# Patient Record
Sex: FEMALE | Race: ASIAN | Employment: UNEMPLOYED | URBAN - METROPOLITAN AREA
[De-identification: names, ages, dates, MRNs, and addresses within clinical notes are randomized per-mention and may not be internally consistent; named-entity substitution may affect disease eponyms.]

---

## 2017-09-07 ENCOUNTER — TRANSFERRED RECORDS (OUTPATIENT)
Dept: HEALTH INFORMATION MANAGEMENT | Facility: CLINIC | Age: 32
End: 2017-09-07

## 2017-10-16 ENCOUNTER — TRANSFERRED RECORDS (OUTPATIENT)
Dept: HEALTH INFORMATION MANAGEMENT | Facility: CLINIC | Age: 32
End: 2017-10-16

## 2017-10-18 ENCOUNTER — TRANSFERRED RECORDS (OUTPATIENT)
Dept: HEALTH INFORMATION MANAGEMENT | Facility: CLINIC | Age: 32
End: 2017-10-18

## 2017-11-08 ENCOUNTER — TRANSFERRED RECORDS (OUTPATIENT)
Dept: HEALTH INFORMATION MANAGEMENT | Facility: CLINIC | Age: 32
End: 2017-11-08

## 2018-08-15 ENCOUNTER — TRANSFERRED RECORDS (OUTPATIENT)
Dept: HEALTH INFORMATION MANAGEMENT | Facility: CLINIC | Age: 33
End: 2018-08-15

## 2018-08-28 ENCOUNTER — TRANSFERRED RECORDS (OUTPATIENT)
Dept: HEALTH INFORMATION MANAGEMENT | Facility: CLINIC | Age: 33
End: 2018-08-28

## 2019-02-26 ENCOUNTER — TRANSFERRED RECORDS (OUTPATIENT)
Dept: HEALTH INFORMATION MANAGEMENT | Facility: CLINIC | Age: 34
End: 2019-02-26

## 2019-02-26 LAB — TSH SERPL-ACNC: 0.72 UIU/ML (ref 0.45–4.5)

## 2019-09-24 LAB
ALT SERPL-CCNC: 46 U/L (ref 0–40)
AST SERPL-CCNC: 31 U/L (ref 9–40)
CREATININE (EXTERNAL): 0.61 MG/DL (ref 0.6–1.1)
GFR ESTIMATED (EXTERNAL): 120 ML/MIN/1.73M2
GLUCOSE (EXTERNAL): 3.7 MG/DL (ref 3.5–5)
POTASSIUM (EXTERNAL): 3.7 MMOL/L (ref 3.5–5)
TSH SERPL-ACNC: 0.04 UIU/ML (ref 0.46–4.88)

## 2019-12-10 LAB — TSH SERPL-ACNC: 0.7 UIU/ML (ref 0.46–4.88)

## 2020-02-21 ENCOUNTER — TRANSFERRED RECORDS (OUTPATIENT)
Dept: HEALTH INFORMATION MANAGEMENT | Facility: CLINIC | Age: 35
End: 2020-02-21

## 2020-02-21 LAB — TSH SERPL-ACNC: 9.03 UIU/ML (ref 0.46–4.88)

## 2020-07-22 LAB — TSH SERPL-ACNC: 14.84 MU/L (ref 0.35–4.94)

## 2021-05-12 LAB — TSH SERPL-ACNC: 13.8 MU/L (ref 0.3–4)

## 2021-07-12 LAB — TSH SERPL-ACNC: 7.61 MU/L (ref 0.3–4)

## 2021-10-21 ENCOUNTER — TRANSFERRED RECORDS (OUTPATIENT)
Dept: HEALTH INFORMATION MANAGEMENT | Facility: CLINIC | Age: 36
End: 2021-10-21

## 2021-10-21 LAB — TSH SERPL-ACNC: 0.39 MU/L (ref 0.35–4.94)

## 2021-11-08 ENCOUNTER — TRANSFERRED RECORDS (OUTPATIENT)
Dept: HEALTH INFORMATION MANAGEMENT | Facility: CLINIC | Age: 36
End: 2021-11-08

## 2022-01-14 ENCOUNTER — TRANSFERRED RECORDS (OUTPATIENT)
Dept: HEALTH INFORMATION MANAGEMENT | Facility: CLINIC | Age: 37
End: 2022-01-14

## 2022-01-21 ENCOUNTER — TRANSFERRED RECORDS (OUTPATIENT)
Dept: HEALTH INFORMATION MANAGEMENT | Facility: CLINIC | Age: 37
End: 2022-01-21

## 2022-04-22 ENCOUNTER — TRANSFERRED RECORDS (OUTPATIENT)
Dept: HEALTH INFORMATION MANAGEMENT | Facility: CLINIC | Age: 37
End: 2022-04-22

## 2022-05-12 ENCOUNTER — TRANSFERRED RECORDS (OUTPATIENT)
Dept: HEALTH INFORMATION MANAGEMENT | Facility: CLINIC | Age: 37
End: 2022-05-12

## 2022-05-24 ENCOUNTER — TRANSFERRED RECORDS (OUTPATIENT)
Dept: HEALTH INFORMATION MANAGEMENT | Facility: CLINIC | Age: 37
End: 2022-05-24

## 2022-11-15 ENCOUNTER — TRANSCRIBE ORDERS (OUTPATIENT)
Dept: OTHER | Age: 37
End: 2022-11-15

## 2022-11-15 ENCOUNTER — TRANSFERRED RECORDS (OUTPATIENT)
Dept: HEALTH INFORMATION MANAGEMENT | Facility: CLINIC | Age: 37
End: 2022-11-15

## 2022-11-15 DIAGNOSIS — C07 ADENOID CYSTIC CARCINOMA OF PAROTID GLAND (H): Primary | ICD-10-CM

## 2022-11-16 ENCOUNTER — TELEPHONE (OUTPATIENT)
Dept: OTOLARYNGOLOGY | Facility: CLINIC | Age: 37
End: 2022-11-16

## 2022-11-16 NOTE — TELEPHONE ENCOUNTER
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 She is wondering if she can be seen sooner, she declined to schedule as she will be gone. She said that she needs scans and labs as well. She would like a call back please.          Action Taken: Other: ENT    Travel Screening: Not Applicable

## 2022-11-17 NOTE — TELEPHONE ENCOUNTER
This patient needs a New appt with either Dr. Hector, Sil, or Bhavna.    Appt Note- Ref by Nakul Cooper for Adenoid Cystic Carcinoma of Parotid Gland

## 2022-11-22 NOTE — TELEPHONE ENCOUNTER
FUTURE VISIT INFORMATION      FUTURE VISIT INFORMATION:    Date: 12/12/2022    Time: 2:40 PM     Location: [facility]   REFERRAL INFORMATION:    Referring provider:  Dr. Nakul Cooper    Referring providers clinic:  [facility]     Reason for visit/diagnosis  Adenoid cystic carcinoma of parotid gland     RECORDS REQUESTED FROM:       Clinic name Comments Records Status Imaging Status   [facility] Oncology     [address] [place] [address]  Fax: [phone] 4/22/2022, 1/21/2022 Office visit with Dr. Cooper    Imaging:  - CT Chest: 1/14/2022  - MRI Face: 1/14/2022   Scanned in [facility]     [place] [facility] 8/28/18, 10/8/17 Office visit with Dr. Dang   Scanned in [facility]   [address] [place] [address]    [phone] 5/24/22 Case: [id number] Parotid Gland, LT PAROTID BED BX  Surgical Pathology: 10/18/17  Parotidectomy: 10/16/17    Imaging:  - US: 9/7/17   Scanned in [facility] Left selection neck dissection:  4/8/2004 Scanned in Epic

## 2022-12-01 ENCOUNTER — LAB (OUTPATIENT)
Dept: LAB | Facility: CLINIC | Age: 37
End: 2022-12-01
Payer: COMMERCIAL

## 2022-12-01 ENCOUNTER — OFFICE VISIT (OUTPATIENT)
Dept: INTERNAL MEDICINE | Facility: CLINIC | Age: 37
End: 2022-12-01
Payer: COMMERCIAL

## 2022-12-01 VITALS
DIASTOLIC BLOOD PRESSURE: 56 MMHG | TEMPERATURE: 98.4 F | OXYGEN SATURATION: 97 % | HEIGHT: 60 IN | SYSTOLIC BLOOD PRESSURE: 95 MMHG | HEART RATE: 75 BPM | BODY MASS INDEX: 23.46 KG/M2 | WEIGHT: 119.5 LBS

## 2022-12-01 DIAGNOSIS — K21.00 GASTROESOPHAGEAL REFLUX DISEASE WITH ESOPHAGITIS, UNSPECIFIED WHETHER HEMORRHAGE: ICD-10-CM

## 2022-12-01 DIAGNOSIS — K58.0 IRRITABLE BOWEL SYNDROME WITH DIARRHEA: Primary | ICD-10-CM

## 2022-12-01 DIAGNOSIS — Z85.850 HISTORY OF THYROID CANCER: ICD-10-CM

## 2022-12-01 DIAGNOSIS — K59.1 FUNCTIONAL DIARRHEA: ICD-10-CM

## 2022-12-01 DIAGNOSIS — E89.0 POSTABLATIVE HYPOTHYROIDISM: ICD-10-CM

## 2022-12-01 LAB
ALBUMIN SERPL BCG-MCNC: 4.7 G/DL (ref 3.5–5.2)
ALP SERPL-CCNC: 64 U/L (ref 35–104)
ALT SERPL W P-5'-P-CCNC: 58 U/L (ref 10–35)
ANION GAP SERPL CALCULATED.3IONS-SCNC: 13 MMOL/L (ref 7–15)
AST SERPL W P-5'-P-CCNC: 40 U/L (ref 10–35)
BILIRUB DIRECT SERPL-MCNC: <0.2 MG/DL (ref 0–0.3)
BILIRUB SERPL-MCNC: 0.3 MG/DL
BUN SERPL-MCNC: 11.2 MG/DL (ref 6–20)
CALCIUM SERPL-MCNC: 8.1 MG/DL (ref 8.6–10)
CHLORIDE SERPL-SCNC: 105 MMOL/L (ref 98–107)
CREAT SERPL-MCNC: 0.6 MG/DL (ref 0.51–0.95)
DEPRECATED HCO3 PLAS-SCNC: 22 MMOL/L (ref 22–29)
ERYTHROCYTE [DISTWIDTH] IN BLOOD BY AUTOMATED COUNT: 12.2 % (ref 10–15)
GFR SERPL CREATININE-BSD FRML MDRD: >90 ML/MIN/1.73M2
GLUCOSE SERPL-MCNC: 103 MG/DL (ref 70–99)
HCT VFR BLD AUTO: 39.4 % (ref 35–47)
HGB BLD-MCNC: 13.5 G/DL (ref 11.7–15.7)
MCH RBC QN AUTO: 29.4 PG (ref 26.5–33)
MCHC RBC AUTO-ENTMCNC: 34.3 G/DL (ref 31.5–36.5)
MCV RBC AUTO: 86 FL (ref 78–100)
PLATELET # BLD AUTO: 287 10E3/UL (ref 150–450)
POTASSIUM SERPL-SCNC: 3.9 MMOL/L (ref 3.4–5.3)
PROT SERPL-MCNC: 7.3 G/DL (ref 6.4–8.3)
RBC # BLD AUTO: 4.59 10E6/UL (ref 3.8–5.2)
SODIUM SERPL-SCNC: 140 MMOL/L (ref 136–145)
T4 FREE SERPL-MCNC: 1.7 NG/DL (ref 0.9–1.7)
TSH SERPL DL<=0.005 MIU/L-ACNC: 0.02 UIU/ML (ref 0.3–4.2)
WBC # BLD AUTO: 5.8 10E3/UL (ref 4–11)

## 2022-12-01 PROCEDURE — 99204 OFFICE O/P NEW MOD 45 MIN: CPT | Mod: GC | Performed by: STUDENT IN AN ORGANIZED HEALTH CARE EDUCATION/TRAINING PROGRAM

## 2022-12-01 PROCEDURE — 36415 COLL VENOUS BLD VENIPUNCTURE: CPT | Performed by: PATHOLOGY

## 2022-12-01 PROCEDURE — 85027 COMPLETE CBC AUTOMATED: CPT | Performed by: PATHOLOGY

## 2022-12-01 PROCEDURE — 80053 COMPREHEN METABOLIC PANEL: CPT | Performed by: PATHOLOGY

## 2022-12-01 PROCEDURE — 82248 BILIRUBIN DIRECT: CPT | Performed by: PATHOLOGY

## 2022-12-01 PROCEDURE — 84439 ASSAY OF FREE THYROXINE: CPT | Performed by: PATHOLOGY

## 2022-12-01 PROCEDURE — 84443 ASSAY THYROID STIM HORMONE: CPT | Performed by: PATHOLOGY

## 2022-12-01 RX ORDER — LEVOTHYROXINE SODIUM 112 UG/1
112 TABLET ORAL DAILY
Qty: 30 TABLET | Refills: 3 | Status: SHIPPED | OUTPATIENT
Start: 2022-12-01 | End: 2023-04-14

## 2022-12-01 RX ORDER — ONDANSETRON 8 MG/1
TABLET, ORALLY DISINTEGRATING ORAL
COMMUNITY
Start: 2022-11-27 | End: 2022-12-01

## 2022-12-01 RX ORDER — CIPROFLOXACIN 500 MG/1
TABLET, FILM COATED ORAL
COMMUNITY
Start: 2022-11-27 | End: 2022-12-01

## 2022-12-01 RX ORDER — ONDANSETRON HYDROCHLORIDE 4 MG/5ML
SOLUTION ORAL
COMMUNITY
Start: 2015-03-30 | End: 2022-12-01

## 2022-12-01 RX ORDER — ACETAMINOPHEN AND CODEINE PHOSPHATE 120; 12 MG/5ML; MG/5ML
0.35 SOLUTION ORAL
COMMUNITY
Start: 2021-03-23 | End: 2022-12-01

## 2022-12-01 RX ORDER — KETOCONAZOLE 20 MG/G
CREAM TOPICAL
COMMUNITY
Start: 2012-04-19 | End: 2022-12-01

## 2022-12-01 RX ORDER — SODIUM FLUORIDE 1.1 G/100G
GEL ORAL
COMMUNITY
Start: 2022-11-27 | End: 2022-12-01

## 2022-12-01 RX ORDER — LEVOTHYROXINE SODIUM 88 UG/1
88 TABLET ORAL
COMMUNITY
Start: 2021-03-23 | End: 2022-12-01

## 2022-12-01 RX ORDER — DICYCLOMINE HCL 20 MG
TABLET ORAL
COMMUNITY
Start: 2022-11-27 | End: 2022-12-01

## 2022-12-01 RX ORDER — AMMONIUM LACTATE 12 G/100G
LOTION TOPICAL
COMMUNITY
Start: 2012-04-19 | End: 2022-12-01

## 2022-12-01 RX ORDER — DICYCLOMINE HYDROCHLORIDE 10 MG/1
10 CAPSULE ORAL
Qty: 30 CAPSULE | Refills: 3 | Status: SHIPPED | OUTPATIENT
Start: 2022-12-01 | End: 2023-05-17

## 2022-12-01 RX ORDER — LEVOTHYROXINE SODIUM 112 UG/1
112 TABLET ORAL DAILY
COMMUNITY
Start: 2022-07-21 | End: 2022-12-01

## 2022-12-01 RX ORDER — PNV NO.52/IRON/FA/OMEGA-3/DHA 29-1-200MG
COMBINATION PACKAGE (EA) ORAL
COMMUNITY
Start: 2021-11-08 | End: 2022-12-01

## 2022-12-01 RX ORDER — BREAST PUMP
EACH MISCELLANEOUS
COMMUNITY
Start: 2021-03-23 | End: 2022-12-01

## 2022-12-01 RX ORDER — ONDANSETRON 4 MG/1
TABLET, ORALLY DISINTEGRATING ORAL
COMMUNITY
Start: 2015-03-30 | End: 2022-12-01

## 2022-12-01 RX ORDER — SODIUM FLUORIDE 5 MG/G
GEL, DENTIFRICE DENTAL
COMMUNITY
Start: 2022-07-31

## 2022-12-01 NOTE — PROGRESS NOTES
I, Robinson Castellano MD saw the patient with the resident, and agree with the resident's findings and plan of care as documented in the resident's note.  BP 95/56 (BP Location: Right arm, Patient Position: Sitting, Cuff Size: Adult Regular)   Pulse 75   Temp 98.4  F (36.9  C) (Oral)   Ht 1.524 m (5')   Wt 54.2 kg (119 lb 8 oz)   SpO2 97%   BMI 23.34 kg/m    I personally reviewed vital signs and past record.  Key findings: multiple chronic medical problems   Needs TSH - has been uncertain length of time.  Irregular bowel symptoms, mostly consistent with IBS.   Preventive services reviewed.    ADDENDUM: suppressed TSH likely permissive since she has a history of papillary CA.

## 2022-12-01 NOTE — PROGRESS NOTES
CC: est care      HPI:  36-year-old female with past medical history of thyroid cancer status post total thyroidectomy and radiation.  She comes in to [facility] care, she is recently moved here.  She needs a refill of her thyroid medication and repeat thyroid hormone check.    She also would like to discuss off-and-on abdominal pain that she has been having.  It is in the epigastric region happens rarely, a few times a year.  She will have cramping-like sensation, nonradiating, associated with diarrhea.  There is no blood in her stool.  She feels nauseous during these episodes however has not vomited.  No fevers, no unexplained weight loss, no constipation.  She took some Bentyl prescribed another provider and it helped with the symptoms.  She has never had a colonoscopy.  She has also been having off-and-on reflux, not necessarily associated with this pain.    PMH: I have personally reviewed the patient's medical history, medications, and allergies.      BP 95/56 (BP Location: Right arm, Patient Position: Sitting, Cuff Size: Adult Regular)   Pulse 75   Temp 98.4  F (36.9  C) (Oral)   Ht 1.524 m (5')   Wt 54.2 kg (119 lb 8 oz)   SpO2 97%   BMI 23.34 kg/m    Physical Examination:    General:  Alert, NAD  HEENT: NC/AT.   Lungs:  CTAB.   C/V:  RRR with no m/r/g.    Abdomen:  soft, ND, NT  Neuro: Alert and conversant, face symmetric. No gross neurologic deficits.  Skin:   warm, dry, no rashes on exposed skin surfaces  Psych:  Alert and oriented. Appropriate affect.        A&P:  RHM  -vaccinations - needs bivalent, will think about it  -UTD on pap - had 2 years ago    GERD  -refill omeprazole    IBS-D  Intermittent abdominal pain/nausea  -start with bentyl PRN, lab w/up  -RTC 4 weeks. In future if symptoms worsening consider colonoscopy v h pylori    H/o papillary thyroid cancer s/p thyroidectomy  -endocrine referral  -recheck tsh  -refill levothyroxine    Jasmin Phelps MD  IM Resident PGY-3    This patient was  discussed and seen with Dr. Castellano

## 2022-12-01 NOTE — RESULT ENCOUNTER NOTE
Thyroid tests look ok - we can keep you at the same dose of levothyroxine can you can go ahead and pick it up. Your liver tests are very slightly elevated - could be due to a viral bug. We will check again in several weeks to see if they have normalized. If not we can discuss further workup. Blood counts and electrolytes look normal.

## 2022-12-01 NOTE — NURSING NOTE
Shayy Calles is a 36 year old female patient that presents today in clinic for the following:    Chief Complaint   Patient presents with     RECHECK     Establish care.  Discuss medication.  Check thyroid.     The patient's allergies and medications were reviewed as noted. A set of vitals were recorded as noted without incident: BP 95/56 (BP Location: Right arm, Patient Position: Sitting, Cuff Size: Adult Regular)   Pulse 75   Temp 98.4  F (36.9  C) (Oral)   Ht 1.524 m (5')   Wt 54.2 kg (119 lb 8 oz)   SpO2 97%   BMI 23.34 kg/m  . The patient does not have any other questions for the provider.    Tristian Cortes, EMT at 9:56 AM on 12/1/2022.  Primary care clinic: [phone]

## 2022-12-12 ENCOUNTER — PRE VISIT (OUTPATIENT)
Dept: OTOLARYNGOLOGY | Facility: CLINIC | Age: 37
End: 2022-12-12

## 2023-01-12 ENCOUNTER — VIRTUAL VISIT (OUTPATIENT)
Dept: INTERNAL MEDICINE | Facility: CLINIC | Age: 38
End: 2023-01-12
Payer: COMMERCIAL

## 2023-01-12 DIAGNOSIS — R74.01 TRANSAMINITIS: Primary | ICD-10-CM

## 2023-01-12 PROCEDURE — 99213 OFFICE O/P EST LOW 20 MIN: CPT | Mod: 95 | Performed by: STUDENT IN AN ORGANIZED HEALTH CARE EDUCATION/TRAINING PROGRAM

## 2023-01-12 NOTE — PATIENT INSTRUCTIONS
Thank you for visiting the Primary Care Center today at the [facility]! The following is some information about our clinic:     Primary Care Center Frequently-Asked Questions    (1) How do I schedule appointments at the [facility]?     Primary Care--to schedule or make changes to an existing appointment, please call our primary care line at [phone].    Labs--to schedule a lab appointment at the [facility] you can use [employer] or call [phone]. If you have a [place] location that is closer to home, you can reach out to that location for scheduling options.     Imaging--if you need to schedule a CT, X-ray, MRI, ultrasound, or other imaging study you can call [phone] to schedule at the [facility] or any other [facility] imaging location.     Referrals--if a referral to another specialty was ordered you can expect a phone call from their scheduling team. If you have not heard from them in a week, please call us or send us a [employer] message to check the status or get a scheduling number. Please note that this only applies to internal [facility] referrals. If the referral is external you would need to contact their office for scheduling.     (2) I have a question about my visit, who do I contact?     You can call us at the primary care line at [phone] to ask questions about your visit. You can also send a secure message through [employer], which is reviewed by clinic staff. Please note that [employer] messages have a twenty-four to forty-eight business hour turnaround time and should not be used for urgent concerns.    (3) How will I get the results of my tests?    If you are signed up for [employer] all tests will be released to you within twenty-four hours of resulting. Please allow three to five days for your doctor to review your results and place a note interpreting the results. If you do not have [employer] you will receive your  results through mail seven to ten business days following the return of the tests. Please note that if there should be any urgent or concerning results that your doctor or their registered nurse will reach out to you the same day as the tests come back. If you have follow up questions about your results or would like to discuss the results in detail please schedule a follow up with your provider either in person or virtually.     (4) How do I get refills of my prescriptions?     You should always first contact your pharmacy for refills of your medications. If submitting a refill request on [employer], please be sure to submit the request only once--repeat requests can cause delays in refill. If you are requesting a NEW medication or a medication related to new symptoms you will need to schedule an appointment with a provider prior to approval. Please note: Routine medication refills have up to one to three business day turnaround whereas controlled substances refills have up to five to seven business day turnaround.    (5) I have new symptoms, what do I do?     If you are having an immediate medical emergency, you should dial 911 for assistance.   For anything urgent that needs to be seen within a few hours to one day you should visit a local urgent care for assistance.  For non-urgent symptoms that need to be seen within a few days to a week you can schedule with an available provider in primary care by going to [employer] or calling [phone].   If you are not sure how serious your symptoms are or you would like to receive medical advice you can always call [phone] to speak with a triage nurse.

## 2023-01-12 NOTE — PROGRESS NOTES
Shayy is a 37 year old who is being evaluated via a billable video visit.      How would you like to obtain your AVS? Avahart  If the video visit is dropped, the invitation should be resent by: Text to cell phone: [phone]  Will anyone else be joining your video visit? No      CC: 6 week follow up      HPI:  37F with PMH of thyroid cancer s/p thyroidectomy presents today to follow up IBS and GERD symptoms. IBS symptoms including pain and nausea that she was previously having have all resolved, she never needed to take any more bentyl after I saw her. GERD has completely improved with the omeprazole and is not bothering her anymore. She may be interested in having a second child in the future, wondering when to restart prenatal vitamins. We discussed she should go ahead and start now. No other health concerns to discuss today. Currently staying with family in [place].    PMH: I have personally reviewed the patient's medical history, medications, and allergies.      There were no vitals taken for this visit.  Physical Examination:    General:  Alert, NAD  HEENT: NC/AT. EOM grossly intact.  Lungs:  Appears breathing comfortably on room air  Neuro: Alert and conversant, face symmetric. No gross neurologic deficits.  Skin:   warm, dry, no rashes on exposed skin surfaces  Psych:  Alert and oriented. Appropriate affect.        A&P:  IBS  Never needed bentyl, IBS type symptoms have completed resolved. No plan for further workup.    Mildly elevated transaminases  -repeat when she is back in town. Potentially elevated 2/2 to a viral illness but needs repeating to ensure resolution. If up again will need further lab w/up and likely abdominal US.    H/o papillary thyroid cancer s/p thyroidectomy  -on levothyroxine; last TSH suppressed but normal ft4  -endo referral made but no appointment yet - she will make when able to establish care with them    Jasmin Phelps MD  IM Resident PGY-3    This patient was discussed with  Dr Moody        Video-Visit Details    Type of service:  Video Visit     Originating Location (pt. Location): Other gym  Distant Location (provider location):  On-site  Platform used for Video Visit: Rylee

## 2023-02-12 ENCOUNTER — HEALTH MAINTENANCE LETTER (OUTPATIENT)
Age: 38
End: 2023-02-12

## 2023-04-13 NOTE — TELEPHONE ENCOUNTER
DX, Referring NOTES: History of Thyroid Cancer    For Cancer Patients: Need the original operative and surgical pathology reports and all imaging reports/images related to the disease (includes all thyroid US, nuclear thyroid and total body scans, PET scans, chest CT reports since prior to the diagnosis ).   APPT DATE: 5/17/2023   NOTES (FOR ALL VISITS) STATUS DETAILS   OFFICE NOTES from referring provider [facility]:  1/12/23, 12/1/22 - PCC VV with Dr. Phelps   OFFICE NOTES from other specialist Received / Care Everywhere [facility]:  1/10/23 - RAD ONC OV with Dr. Cooper    [facility]:  8/28/18, 11/8/17 - ONC OV with Dr. Dang    [facility] ([place]):  5/24/22, 5/12/22 - ENT OV with Dr. Madrid   ED NOTES N/A    OPERATIVE REPORT  (thyroid, pituitary, adrenal, parathyroid)  (All op notes related to diagnoses) Received [facility]:  10/16/17 - OP Note for TOTAL PAROTIDECTOMY with Dr. [place]  4/9/04 - OP Note for LEFT SELECTIVE DISSECTION, PARATRACHEAL NODE DISSECTION, TOTAL THYROIDECTOMY with Dr. Madrid   MEDICATION LIST Internal    IMAGING      MRI (BRAIN) Received [facility]:  1/3/23 - MRI Orbits/Face  1/14/22 - MRI Orbits/Face  6/8/20 - MRI Orbits/Face  11/25/19 - MRI Orbits/Face  11/14/18 - MRI Orbits/Face    [facility]:  3/22/18 - MRI Neck  11/2/17 - MRI Neck   CT (HEAD/NECK/CHEST/ABDOMEN) Received [facility]:  1/3/23 - CT Chest  1/14/22 - CT Chest  11/25/19 - CT Chest  2/7/19 - CT Chest   ULTRASOUND (HEAD/NECK)  * Include FNAs Received [facility]:  9/7/17 - US Thyroid FNA  12/20/12  US Thyroid   LABS     DIABETES: HBGA1C, CREATININE, FASTING LIPIDS, MICROALBUMIN URINE, POTASSIUM, TSH, T4    THYROID: TSH, T4, CBC, THYRODLONULIN, TOTAL T3, FREE T4, CALCITONIN, CEA Care Everywhere / [facility]:  12/1/22 - BMP  12/1/22 - CBC  12/1/22 - TSH, T4    [facility]:  12/30/20 - Glucose  8/19/20 - HBGA1C  7/22/20 - Thyroglobulin   PATHOLOGY  REPORTS WITH CASE NUMBER  *Surgical path reports for endocrine organs (ovaries, testes, pancreas, etc) Care Everywhere / Received   [facility]:  22 - Parotid Biopsy (Case: [id number])  10/16/17 - Parotid Biopsy (Case: [id number])  17 - Thyroid FNA (Case: [id number])    [facility]:  04 - Thyroid Biopsy (Case: [id number])     Records Requested  23    Facility  [facility]  Phone: [phone]  Fax: [phone]  [facility]  Fax: [phone]  [facility]  Fax: [phone]   Outcome * 23 8:34 AM Faxed urg req to [facility] and [facility] for imaging discs to be Fed'Exd to us.  Faxed req to [facility] for records to be faxed to the clinic. - [place]    * 23 2:40 PM Imaging disc received from [facility] and in [facility] being uploaded into PACs. - [place]    * 23 3:50 PM Records received from [facility] and sent to [facility] to be scanned into the chart. - [place]    [facility]  [facility]

## 2023-04-14 DIAGNOSIS — K59.1 FUNCTIONAL DIARRHEA: ICD-10-CM

## 2023-04-14 DIAGNOSIS — E89.0 POSTABLATIVE HYPOTHYROIDISM: ICD-10-CM

## 2023-04-14 NOTE — TELEPHONE ENCOUNTER
levothyroxine (SYNTHROID/LEVOTHROID) 112 MCG tablet      Last Written Prescription Date:  12/1/22  Last Fill Quantity: 30,   # refills: 3  Last Office Visit : 1/12/23  Future Office visit:  NONE    Routing refill request to provider for review/approval because:  ABNORMAL TSH

## 2023-04-17 RX ORDER — LEVOTHYROXINE SODIUM 112 UG/1
112 TABLET ORAL DAILY
Qty: 30 TABLET | Refills: 3 | Status: SHIPPED | OUTPATIENT
Start: 2023-04-17 | End: 2023-05-24

## 2023-05-02 NOTE — TELEPHONE ENCOUNTER
Records received    May 2, 2023 1:56 PM  [facility]   Facility  [facility]    Outcome Received imaging disc, uploaded to PACS:   MR NECK 3/22/18, 11/2/17  US FNA 9/7/17  US Thyroid 12/20/12

## 2023-05-17 ENCOUNTER — LAB (OUTPATIENT)
Dept: LAB | Facility: CLINIC | Age: 38
End: 2023-05-17
Payer: COMMERCIAL

## 2023-05-17 ENCOUNTER — OFFICE VISIT (OUTPATIENT)
Dept: ENDOCRINOLOGY | Facility: CLINIC | Age: 38
End: 2023-05-17
Attending: PEDIATRICS
Payer: COMMERCIAL

## 2023-05-17 ENCOUNTER — PRE VISIT (OUTPATIENT)
Dept: ENDOCRINOLOGY | Facility: CLINIC | Age: 38
End: 2023-05-17

## 2023-05-17 VITALS
BODY MASS INDEX: 24.16 KG/M2 | WEIGHT: 123.7 LBS | HEART RATE: 78 BPM | OXYGEN SATURATION: 98 % | SYSTOLIC BLOOD PRESSURE: 124 MMHG | DIASTOLIC BLOOD PRESSURE: 79 MMHG

## 2023-05-17 DIAGNOSIS — C73 PAPILLARY THYROID CARCINOMA (H): ICD-10-CM

## 2023-05-17 DIAGNOSIS — R74.01 TRANSAMINITIS: ICD-10-CM

## 2023-05-17 DIAGNOSIS — E89.0 POSTSURGICAL HYPOTHYROIDISM: ICD-10-CM

## 2023-05-17 DIAGNOSIS — C73 PAPILLARY THYROID CARCINOMA (H): Primary | ICD-10-CM

## 2023-05-17 DIAGNOSIS — Z85.850 HISTORY OF THYROID CANCER: ICD-10-CM

## 2023-05-17 LAB
ALBUMIN SERPL BCG-MCNC: 4.5 G/DL (ref 3.5–5.2)
ALP SERPL-CCNC: 60 U/L (ref 35–104)
ALT SERPL W P-5'-P-CCNC: 44 U/L (ref 10–35)
AST SERPL W P-5'-P-CCNC: 21 U/L (ref 10–35)
BILIRUB DIRECT SERPL-MCNC: <0.2 MG/DL (ref 0–0.3)
BILIRUB SERPL-MCNC: 0.3 MG/DL
PROT SERPL-MCNC: 7 G/DL (ref 6.4–8.3)
T4 FREE SERPL-MCNC: 1.61 NG/DL (ref 0.9–1.7)
TSH SERPL DL<=0.005 MIU/L-ACNC: 0.03 UIU/ML (ref 0.3–4.2)

## 2023-05-17 PROCEDURE — 99000 SPECIMEN HANDLING OFFICE-LAB: CPT | Performed by: PATHOLOGY

## 2023-05-17 PROCEDURE — 84443 ASSAY THYROID STIM HORMONE: CPT | Performed by: PATHOLOGY

## 2023-05-17 PROCEDURE — 84432 ASSAY OF THYROGLOBULIN: CPT | Mod: 90 | Performed by: PATHOLOGY

## 2023-05-17 PROCEDURE — 36415 COLL VENOUS BLD VENIPUNCTURE: CPT | Performed by: PATHOLOGY

## 2023-05-17 PROCEDURE — 80076 HEPATIC FUNCTION PANEL: CPT | Performed by: PATHOLOGY

## 2023-05-17 PROCEDURE — 86800 THYROGLOBULIN ANTIBODY: CPT | Mod: 90 | Performed by: PATHOLOGY

## 2023-05-17 PROCEDURE — 84439 ASSAY OF FREE THYROXINE: CPT | Performed by: PATHOLOGY

## 2023-05-17 PROCEDURE — 99204 OFFICE O/P NEW MOD 45 MIN: CPT

## 2023-05-17 RX ORDER — LETROZOLE 2.5 MG/1
TABLET, FILM COATED ORAL
COMMUNITY
Start: 2023-03-20

## 2023-05-17 RX ORDER — PRENATAL VIT/IRON FUM/FOLIC AC 27MG-0.8MG
1 TABLET ORAL DAILY
COMMUNITY

## 2023-05-17 ASSESSMENT — PAIN SCALES - GENERAL: PAINLEVEL: NO PAIN (0)

## 2023-05-17 NOTE — LETTER
5/17/2023       RE: Shayy Calles  [address]  [place] [address]     Dear Colleague,    Thank you for referring your patient, Shayy Calles, to the [facility]. Please see a copy of my visit note below.    Endocrine Consult note    Attending Assessment/Plan :     Papillary thyroid carcinoma, pediatric onset, left 2.8 cm with ETE into ST and muscle , Multifocal, bilateral, + LN 3/3  Yearly lab: Tg, TSH, free T4   Neck US hasn't been done in many years- ordered.      Post surgical hypothyroidism- on LT4 112 mcg/day; target TSH low normal/around 0.4 would be better than recent levels  Labs and adjust as indicated.     Tremor- as above.     Seeking pregnancy.  Discussed.  Would  Check TFTs immediately on diagnosis of pregnancy and monthly treating to pregnancy specific targets.     Chart review/prep time 1  [id number]   _46_ minutes spent on the date of the encounter doing chart review, history and exam, documentation and further activities as noted above.    Maria Antonia Pacheco MD    Chief complaint:  Shayy is a 37 year old female seen in consultation at the request of Dr Robinson Castellano for thyroid cancer   I have reviewed Care Everywhere including [facility], [facility] lab reports, imaging reports and provider notes as indicated.      HISTORY OF PRESENT ILLNESS    The record shows the following history :   4/8/2004 total thyroidectomy,  - papillary carcinoma left with ETE into ST and muscle, positive margins; Right microscopic Papillary thyroid carcinoma; + LN 3/3 with no JITENDRA     She has been on the current dose LT4 since she was pregnant with the now 2 year old boy.   She is seeking another  pregnancy.     She was seeing reproductive endocrinology at [facility]. They were giving her letrozole. She continues on this.  With this her menses have become monthly.      Endocrine relevant labs are as follows:  9/16/19 Tg < 0.5 ARUP  7/22/20 Tg < 0.5 [facility]UP  9/23/2020 free T4 1.9  12/30/20 TSH 0.27  1/27/21 TSH 0.33  5/12/2021 TSH 13.8  7/12/21  Tg < 0.5 ARUP, TSH 7.61  10/21/21 TSH 0.39 free T4 1.7  11/8/2021 Tg < 0.5 [facility]UP  12/1/22 TSH 0.02, free T4 1.7, Ca 8.1, creatinine 0.6    Relevant imaging is as follows: (as read by me as it pertains to endocrine relevant organs)  12/20/12 thyroid US  1/3/2023 CT chest with contrast  1/3/2023 MRI orbita face    REVIEW OF SYSTEMS  Sleep at night is better since baby turned 2  Energy OK   Weight stable   Cardiac: negative  Respiratory: negative   GI;   Acid reflux - feels it in throat   Monthly periods on letrozole.  Periods were irregular before letrozole.   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      levothyroxine (SYNTHROID/LEVOTHROID) 112 MCG tablet Take 1 tablet (112 mcg) by mouth daily 30 tablet 3    omeprazole (PRILOSEC) 20 MG DR capsule Take 1 capsule (20 mg) by mouth daily 30 capsule 4    Prenatal Vit-Fe Fumarate-FA (PRENATAL MULTIVITAMIN W/IRON) 27-0.8 MG tablet Take 1 tablet by mouth daily      sodium fluoride dental gel (PREVIDENT) 1.1 % GEL topical gel        Letrozole is 5 mg on days 3-7 of menstrual cycle.      Allergies  No Known Allergies    Family  History  Family History   Problem Relation Age of Onset    Asthma Mother     Hypertension Mother     Diabetes Father     Myocardial Infarction Father     Hyperlipidemia Father     Lung Cancer Paternal Grandmother 90    Breast Cancer Maternal Aunt     Thyroid Disease Maternal Aunt     Thyroid Cancer No family hx of      2 rosanne simpson Son has recent diagnosis of ASD     Social History  Social History     Tobacco Use    Smoking status: Never    Smokeless tobacco: Never     Just moved from [place] for a year; moving to [place] in 3 months - [facility]    is a stroke fellow     Physical Exam  GENERAL no mask; holding toddler who is crying loudly about 75% of the appt   /79   Pulse 78   Wt 56.1 kg (123 lb 11.2 oz)   SpO2 98%   BMI 24.16 kg/m    SKIN: normal color, temperature, texture ; hat ; keloid scar low neck / upper chest surgical site   HEENT: PER, no scleral icterus, eyelid retraction, stare, lid lag, proptosis or conjunctival injection.  .    NECK: supple.  No visible or palpable neck masses, cervical adenopathy  LUNGS: clear to auscultation bilaterally.   CARDIAC: RRR, S1, S2 without murmurs, rubs or gallops.    BACK: normal spinal contour.    NEURO: Alert, responds appropriately to questions,  moves all extremities, DTRs 2/4, gait normal, + slight  tremor of the outstretched hand    DATA REVIEW     Latest Ref Rng 12/1/2022  10:44 AM 5/17/2023  2:43 PM   ENDO THYROID LABS-[facility]      TSH 0.30 - 4.20 uIU/mL 0.02 (L)  0.03 (L)    FREE T4 0.90 - 1.70 ng/dL 1.70  1.61       Legend:  (L) Low

## 2023-05-17 NOTE — PATIENT INSTRUCTIONS
Yearly labs now and in one year about 2 weeks prior to next appt    Neck US for baseline    Care for the thyroid should be approximately yearly , or sooner in the event of pregnancy    [email or web address] website - information for patients - find a doctor     Please reach out to the following centers to schedule your appointment:       Imaging (DEXA, CT, MRI, XRAY)    [facility] ([facility], [facility], [place]) [phone]   [facility] ([place], [place]) [phone]   [facility] ([facility]) [phone]   [facility] ([facility]) [phone]       Lab    General [phone]   [facility] [phone]   [place] [phone]   [facility]  [phone]   [facility] [phone]   [place] [phone]   [facility]) [phone]   [facility] Walk-In Only   [place] [phone]   [place] [phone]   [place] [phone]   [place] [phone]       Infusion    [facility] [phone]   [place] [phone]   [place] [phone]   [place] [phone]   [place] [phone]   [place] [phone]   [place] [phone]     For any questions, please reach out to the [facility] Clinic Number for assistance: [phone].

## 2023-05-17 NOTE — PROGRESS NOTES
"Endocrine Consult note    Attending Assessment/Plan :     Papillary thyroid carcinoma, pediatric onset, left 2.8 cm with ETE into ST and muscle , Multifocal, bilateral, + LN 3/3  Yearly lab: Tg, TSH, free T4   Neck US hasn't been done in many years- ordered.      Post surgical hypothyroidism- on LT4 112 mcg/day; target TSH low normal/around 0.4 would be better than recent levels  Labs and adjust as indicated.     Addendum  5/17/23 Tg 0.16, JULIANNE < 0.4, TSH 0.03, free T4 1.61,   5/31/23 neck US:   Left level 2 jawline # 1 1.1 x 0.6 x 1.2 cm -- 6/27/23 FNAB LN \"atypical cells\"  Needle wash Tg < 0.1, Julianne < 0.4,   Left level 2 # 2 0.7 x 0.7x 0.8 cm   Left level 6 # 1 0.3 x 0.2 x 0.4 cm   Left level 6 # 2 0.2 x 0.2 x 0.3 cm    Tremor- as above.     Seeking pregnancy.  Discussed.  Would  Check TFTs immediately on diagnosis of pregnancy and monthly treating to pregnancy specific targets.     Chart review/prep time 1  [id number]   _46_ minutes spent on the date of the encounter doing chart review, history and exam, documentation and further activities as noted above.    Maria Antonia Pacheco MD    Chief complaint:  Shayy is a 37 year old female seen in consultation at the request of Dr Robinson Castellano for thyroid cancer   I have reviewed Care Everywhere including [facility], [facility] lab reports, imaging reports and provider notes as indicated.      HISTORY OF PRESENT ILLNESS    The record shows the following history :   4/8/2004 total thyroidectomy,  - papillary carcinoma left with ETE into ST and muscle, positive margins; Right microscopic Papillary thyroid carcinoma; + LN 3/3 with no JITENDRA     She has been on the current dose LT4 since she was pregnant with the now 2 year old boy.   She is seeking another  pregnancy.     She was seeing reproductive endocrinology at [facility]. They were giving her letrozole. She continues on this.  With this her menses have become monthly.     Endocrine " relevant labs are as follows:  9/16/19 Tg < 0.5 [facility]  7/22/20 Tg < 0.5 [facility]  9/23/2020 free T4 1.9  12/30/20 TSH 0.27  1/27/21 TSH 0.33  5/12/2021 TSH 13.8  7/12/21  Tg < 0.5 [facility], TSH 7.61  10/21/21 TSH 0.39 free T4 1.7  11/8/2021 Tg < 0.5 [facility]  12/1/22 TSH 0.02, free T4 1.7, Ca 8.1, creatinine 0.6    Relevant imaging is as follows: (as read by me as it pertains to endocrine relevant organs)  12/20/12 thyroid US  1/3/2023 CT chest with contrast  1/3/2023 MRI orbita face    REVIEW OF SYSTEMS  Sleep at night is better since baby turned 3  Energy OK   Weight stable   Cardiac: negative  Respiratory: negative   GI;   Acid reflux - feels it in throat   Monthly periods on letrozole.  Periods were irregular before letrozole.   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       levothyroxine (SYNTHROID/LEVOTHROID) 112 MCG tablet Take 1 tablet (112 mcg) by mouth daily 30 tablet 3     omeprazole (PRILOSEC) 20 MG DR capsule Take 1 capsule (20 mg) by mouth daily 30 capsule 4     Prenatal Vit-Fe Fumarate-FA (PRENATAL MULTIVITAMIN W/IRON) 27-0.8 MG tablet Take 1 tablet by mouth daily       sodium fluoride dental gel (PREVIDENT) 1.1 % GEL topical gel        Letrozole is 5 mg on days 3-7 of menstrual cycle.      Allergies  No Known Allergies    Family  History  Family History   Problem Relation Age of Onset     Asthma Mother      Hypertension Mother      Diabetes Father      Myocardial Infarction Father      Hyperlipidemia Father      Lung Cancer Paternal Grandmother 90     Breast Cancer Maternal Aunt      Thyroid Disease Maternal Aunt      Thyroid Cancer No family hx of      2 rosanne simpson Son has recent diagnosis of ASD     Social History  Social History     Tobacco Use     Smoking status: Never     Smokeless tobacco: Never     Just moved from [place] for a year; moving to [place] in 3 months - [facility]    is a stroke fellow     Physical Exam  GENERAL no mask; holding toddler who is crying loudly about 75% of the appt   /79   Pulse 78   Wt 56.1 kg (123 lb 11.2 oz)   SpO2 98%   BMI 24.16 kg/m    SKIN: normal color, temperature, texture ; hat ; keloid scar low neck / upper chest surgical site   HEENT: PER, no scleral icterus, eyelid retraction, stare, lid lag, proptosis or conjunctival injection.  .    NECK: supple.  No visible or palpable neck masses, cervical adenopathy  LUNGS: clear to auscultation bilaterally.   CARDIAC: RRR, S1, S2 without murmurs, rubs or gallops.    BACK: normal spinal contour.    NEURO: Alert, responds appropriately to questions,  moves all extremities, DTRs 2/4, gait normal, + slight  tremor of the outstretched hand    DATA REVIEW     Latest Ref Rng 12/1/2022  10:44 AM 5/17/2023  2:43 PM   ENDO THYROID LABS[facility]      TSH 0.30 - 4.20 uIU/mL 0.02 (L)  0.03 (L)    FREE T4 0.90 - 1.70 ng/dL 1.70  1.61       Legend:  (L) Low    EXAMINATION: US HEAD NECK SOFT TISSUE, 5/31/2023 8:32 AM   COMPARISON: None.  HISTORY: Thyroid cancer  TECHNIQUE: Sonographic imaging performed of the neck to evaluate forlymph nodes using grey scale and limited Doppler technique.  FINDINGS:  Lymph nodes are measured bilaterally with measurements given intransverse, AP, and craniocaudal dimensions as follows:  Right:  Level 2: Benign lymph nodes  measuring up to 1.4 x 0.5 x 1.6 cm (#2)with benign fatty hilum, reniform shaped, and nonthickened cortex  Left:  Level 2: Abnormal lymph nodes measuring up to 1.1 x 0.6 x 1.2 cm (#1)with obliteration of the fatty hilum, lobular shape, and thickenedcortex.  Level 6: Questionable tiny lymph nodes in the resection bed to smallto characterize by ultrasound.                                                         IMPRESSION:  1.  Soft tissue neck ultrasound with lymph node measurements asdescribed above. Abnormal lymph nodes left level 2, measuring up to1.2 cm.  I have personally reviewed the examination and initial interpretationand I agree with the findings.  TERRIE MAYES MD     EXAMINATION: US BIOPSY FINE NEEDLE ASPIRATION LYMPH NODE, 6/27/2023  10:43 AM     COMPARISON: 5/31/2023 ultrasound, 1/3/2023 MRI     HISTORY: Postablative hypothyroidism; History of parotid cancer;  Postsurgical hypothyroidism; Cervical adenopathy     FINDINGS: After describing the risks, benefits, and alternatives to  ultrasound guided left level 2 lymph node fine needle aspiration, the  patient elected to proceed and written/informed consent was obtained.     The patient was then placed supine and preliminary grayscale images  demonstrated a mildly enlarged hypoechoic lymph node without  appreciable fatty hilum at left level 2. The patient was then prepped  and draped in a sterile fashion. Local anesthesia was achieved using 2  cc of 1% lidocaine. Under direct sonographic guidance, 4 passes of the  nodule were performed using 25-gauge needles. Preliminary  interpretation from pathology suggests adequate cellularity for  diagnosis. There were no immediate complications.                                                                      IMPRESSION: Technically successful ultrasound-guided left level 2  lymph node fine-needle aspiration.     LIEN TOTH, DO      Fine Needle Aspirate Lymph Node(s), Cervical, Left: [id number]  Order:  [id number]   Collected 6/27/2023 10:22 AM      Status: Final result      Visible to patient: Yes (not seen)      Dx: Postsurgical hypothyroidism; Postabla...      1 Result Note     1 Patient Communication       Component Ref Range & Units    Final Diagnosis   Specimen A              Interpretation:                Atypical cells present (see comment)              Adequacy:              Satisfactory for evaluation      Electronically signed by Endy Estrada III, MD on 6/28/2023 at  1:40 PM   Comment     Patient's history of adenoid cystic carcinoma of parotid and metastatic papillary thyroid carcinoma is noted. The smear preparations demonstrate few singly scattered and clusters of atypical cells with enlarged hyperchromatic nuclei in a background of scant polymorphous lymphocytes. Imaging and clinical correlation with a repeat tissue biopsy is recommended.   Clinical Information     The patient is a 37 year old female with history of papillary thyroid cancer metastatic to left supraclavicular lymph node and paratracheal lymph node, and left parotid gland adenoid cystic carcinoma.   Rapid Onsite Evaluation     FNA Performance:   Fine needle aspiration was not performed by [facility] staff.  Aspirate immediate study/adequacy:  AUGUSTUS GRAYSON AASTHA, MD, attest that I immediately examined smears while the procedure was underway and determined or confirmed the adequacy of the specimens via telepathology.  It is of note that the final assessment and report may be performed and signed by a different pathologist.  Onsite adequacy/interpretation:  A: adequate       Gross Description     A(A). Lymph Node(s), Cervical, Left, left level 2 (1.2 cm):A. Lymph Node(s), Cervical, Left, left level 2 (1.2 cm), Fine Needle Aspirate:  Received are 3 fixed slides, processed for Pap stain, and 3 air dried slides, processed for Diff Quik stain. Material in RPMI held for future studies. Thyroglobulin sent in saline.   Microscopic  Description     Case was reviewed by the following:  PATHOLOGY FELLOW: DARIN DOWNING  A resident or fellow in a training program was involved in the initial review, preparation, and/or interpretation of this case.  I, as the senior physician, attest that I have personally reviewed all specimens and or slides, including the listed special stains, and used them with my medical judgement to determine the final diagnosis.     Abnormal Result? No Yes Abnormal     Performing Labs     The technical component of this testing was completed at [facility] and [facility]   Resulting Agency  [id number]              Specimen Collected: 06/27/23 10:22 AM Last Resulted: 06/28/23  1:40 PM        Order Details      View Encounter      Lab and Collection Details      Routing      Result History     View All Conversations on this Encounter           Scans on Order [id number]    Document on 6/28/2023  1:41 PM by Endy Estrada III, MD         Result Care Coordination      Result Notes and Patient Communication

## 2023-05-18 DIAGNOSIS — R74.01 TRANSAMINITIS: Primary | ICD-10-CM

## 2023-05-19 ENCOUNTER — TELEPHONE (OUTPATIENT)
Dept: INTERNAL MEDICINE | Facility: CLINIC | Age: 38
End: 2023-05-19
Payer: COMMERCIAL

## 2023-05-24 DIAGNOSIS — K59.1 FUNCTIONAL DIARRHEA: ICD-10-CM

## 2023-05-24 DIAGNOSIS — E89.0 POSTABLATIVE HYPOTHYROIDISM: ICD-10-CM

## 2023-05-24 LAB — SCANNED LAB RESULT: NORMAL

## 2023-05-24 RX ORDER — LEVOTHYROXINE SODIUM 112 UG/1
TABLET ORAL
Qty: 86 TABLET | Refills: 4 | Status: SHIPPED | OUTPATIENT
Start: 2023-05-24

## 2023-05-30 DIAGNOSIS — K21.00 GASTROESOPHAGEAL REFLUX DISEASE WITH ESOPHAGITIS, UNSPECIFIED WHETHER HEMORRHAGE: ICD-10-CM

## 2023-05-31 ENCOUNTER — ANCILLARY PROCEDURE (OUTPATIENT)
Dept: ULTRASOUND IMAGING | Facility: CLINIC | Age: 38
End: 2023-05-31
Attending: INTERNAL MEDICINE
Payer: COMMERCIAL

## 2023-05-31 ENCOUNTER — ANCILLARY PROCEDURE (OUTPATIENT)
Dept: ULTRASOUND IMAGING | Facility: CLINIC | Age: 38
End: 2023-05-31
Payer: COMMERCIAL

## 2023-05-31 ENCOUNTER — LAB (OUTPATIENT)
Dept: LAB | Facility: CLINIC | Age: 38
End: 2023-05-31
Payer: COMMERCIAL

## 2023-05-31 DIAGNOSIS — E89.0 POSTSURGICAL HYPOTHYROIDISM: ICD-10-CM

## 2023-05-31 DIAGNOSIS — R74.01 TRANSAMINITIS: ICD-10-CM

## 2023-05-31 DIAGNOSIS — C73 PAPILLARY THYROID CARCINOMA (H): ICD-10-CM

## 2023-05-31 LAB
HBV CORE AB SERPL QL IA: NONREACTIVE
HBV SURFACE AB SERPL IA-ACNC: 223.16 M[IU]/ML
HBV SURFACE AB SERPL IA-ACNC: REACTIVE M[IU]/ML
HBV SURFACE AG SERPL QL IA: NONREACTIVE
HCV AB SERPL QL IA: NONREACTIVE

## 2023-05-31 PROCEDURE — 86039 ANTINUCLEAR ANTIBODIES (ANA): CPT | Performed by: PATHOLOGY

## 2023-05-31 PROCEDURE — 86706 HEP B SURFACE ANTIBODY: CPT | Performed by: PATHOLOGY

## 2023-05-31 PROCEDURE — 76705 ECHO EXAM OF ABDOMEN: CPT | Mod: GC | Performed by: RADIOLOGY

## 2023-05-31 PROCEDURE — 36415 COLL VENOUS BLD VENIPUNCTURE: CPT | Performed by: PATHOLOGY

## 2023-05-31 PROCEDURE — 99000 SPECIMEN HANDLING OFFICE-LAB: CPT | Performed by: PATHOLOGY

## 2023-05-31 PROCEDURE — 86704 HEP B CORE ANTIBODY TOTAL: CPT | Performed by: PATHOLOGY

## 2023-05-31 PROCEDURE — 87340 HEPATITIS B SURFACE AG IA: CPT | Performed by: PATHOLOGY

## 2023-05-31 PROCEDURE — 86038 ANTINUCLEAR ANTIBODIES: CPT | Performed by: PATHOLOGY

## 2023-05-31 PROCEDURE — 86803 HEPATITIS C AB TEST: CPT | Performed by: PATHOLOGY

## 2023-05-31 PROCEDURE — 83516 IMMUNOASSAY NONANTIBODY: CPT | Mod: 90 | Performed by: PATHOLOGY

## 2023-05-31 PROCEDURE — 76536 US EXAM OF HEAD AND NECK: CPT | Mod: GC | Performed by: RADIOLOGY

## 2023-06-01 ENCOUNTER — OFFICE VISIT (OUTPATIENT)
Dept: INTERNAL MEDICINE | Facility: CLINIC | Age: 38
End: 2023-06-01
Payer: COMMERCIAL

## 2023-06-01 VITALS
SYSTOLIC BLOOD PRESSURE: 116 MMHG | OXYGEN SATURATION: 97 % | HEIGHT: 60 IN | BODY MASS INDEX: 23.89 KG/M2 | HEART RATE: 76 BPM | DIASTOLIC BLOOD PRESSURE: 67 MMHG | WEIGHT: 121.7 LBS

## 2023-06-01 DIAGNOSIS — K21.00 GASTROESOPHAGEAL REFLUX DISEASE WITH ESOPHAGITIS, UNSPECIFIED WHETHER HEMORRHAGE: Primary | ICD-10-CM

## 2023-06-01 LAB
ANA PAT SER IF-IMP: ABNORMAL
ANA SER QL IF: ABNORMAL
ANA TITR SER IF: ABNORMAL {TITER}
SMA IGG SER-ACNC: 16 UNITS

## 2023-06-01 PROCEDURE — 99213 OFFICE O/P EST LOW 20 MIN: CPT | Mod: GE | Performed by: STUDENT IN AN ORGANIZED HEALTH CARE EDUCATION/TRAINING PROGRAM

## 2023-06-01 NOTE — NURSING NOTE
"Shayy Calles is a 37 year old female patient that presents today in clinic for the following:    Chief Complaint   Patient presents with     Follow Up     Pt would like to discuss liver lab results, thyroid and carotid gland cancer, previous MRIs, and acid reflux. Pt needs med refills.     The patient's allergies and medications were reviewed as noted. A set of vitals were recorded as noted without incident: /67 (BP Location: Right arm, Patient Position: Sitting, Cuff Size: Adult Small)   Pulse 76   Ht 1.535 m (5' 0.43\")   Wt 55.2 kg (121 lb 11.2 oz)   LMP 05/18/2023 (Approximate)   SpO2 97%   BMI 23.43 kg/m  . The patient does not have any other questions for the provider.    Shawna Swain, EMT at 2:00 PM on 6/1/2023  "

## 2023-06-01 NOTE — PROGRESS NOTES
CC:  Chief Complaint   Patient presents with     Follow Up     Pt would like to discuss liver lab results, thyroid and carotid gland cancer, previous MRIs, and acid reflux. Pt needs med refills.         HPI:    Acid reflux. Chronic and has been present for years, triggered by spicy food. Associated with occasional abdominal discomfort. Bitter taste at back of mouth for ~1 week, self-resolved. She has had one episode of waking up from sleep with chest discomfort. No suspected aspiration events. No dysphagia or odynophagia. She has been taking omeprazole 20 mg ~every other day. She would like a refill today. No significant weight loss. Rare alcohol intake. Non-smoker.     Reviewed abdominal US, neck US, and prior MRI of face.           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 parotid     Asthma      History of therapeutic radiation      Infertility due to oligo-ovulation      Papillary thyroid carcinoma (H) 04/08/2004     Postsurgical hypothyroidism 04/08/2004     Past Surgical History:   Procedure Laterality Date     paratracheal lymph node dissection Left 04/08/2004    selective     PAROTIDECTOMY Left 10/16/2017     TOTAL THYROIDECTOMY  04/08/2004     Family History   Problem Relation Age of Onset     Asthma Mother      Hypertension Mother      Diabetes Father      Myocardial Infarction Father      Hyperlipidemia Father      Lung Cancer Paternal Grandmother 90     Breast Cancer Maternal Aunt      Thyroid Disease Maternal Aunt      Heart Defect Son         ASD     Thyroid Cancer No family hx of      Social History     Tobacco Use     Smoking status: Never     Smokeless tobacco: Never     Current Outpatient Medications   Medication Sig Dispense Refill     letrozole (FEMARA) 2.5 MG tablet Take 2 tablets (5 mg) by mouth once for 1 day.       levothyroxine (SYNTHROID/LEVOTHROID) 112 MCG tablet MON to SAT 1 tablet/day; SUN 0.5 tablet 86 tablet 4      "omeprazole (PRILOSEC) 20 MG DR capsule Take 1 capsule (20 mg) by mouth daily 30 capsule 4     Prenatal Vit-Fe Fumarate-FA (PRENATAL MULTIVITAMIN W/IRON) 27-0.8 MG tablet Take 1 tablet by mouth daily       sodium fluoride dental gel (PREVIDENT) 1.1 % GEL topical gel           Allergies   Allergen Reactions     No Clinical Screening - See Comments          /67 (BP Location: Right arm, Patient Position: Sitting, Cuff Size: Adult Small)   Pulse 76   Ht 1.535 m (5' 0.43\")   Wt 55.2 kg (121 lb 11.2 oz)   LMP 05/18/2023 (Approximate)   SpO2 97%   BMI 23.43 kg/m    Physical Examination:    General:  Conversant, generally healthy appearing, no acute distress  Head: atraumatic  Eyes:  Pupils 2-3 mm, sclera white, EOM's full, conjunctiva moist, no periorbital swelling    Lungs:  Clear to auscultation throughout, no wheezes, rhonchi or rales. Normal respiratory effort and no intercostal retractions.  C/V:  Regular rate and rhythm, no murmurs, rubs or gallops.   Abdomen:  Not distended.  No tenderness, no hepatosplenomegaly or masses.   Neuro: Alert and oriented, face symmetric. Able to get on/off exam table without assistance.  Gait steady.   M/S:   No joint deformities noted.  No joint swelling.  Skin:   Normal temperature., turgor and texture. No rashes, suspicious lesions, or jaundice on exposed skin surfaces.   Psych:  Alert and oriented. Appropriate affect.  Not psychomotor slowed.  No signs of anxiety or agitation.      A&P:    Shayy was seen today for follow up.    Diagnoses and all orders for this visit:    Gastroesophageal reflux disease with esophagitis, unspecified whether hemorrhage  -     Adult GI  Referral - Procedure Only; Future  -     omeprazole (PRILOSEC) 20 MG DR capsule; Take 1 capsule (20 mg) by mouth daily        Patient staffed with Dr. Bay Moody.    Rohit Ariza MD  Internal Medicine Resident PGY 3  Pager: [phone]    "

## 2023-06-05 ENCOUNTER — TELEPHONE (OUTPATIENT)
Dept: ENDOCRINOLOGY | Facility: CLINIC | Age: 38
End: 2023-06-05
Payer: COMMERCIAL

## 2023-06-05 DIAGNOSIS — Z85.818 HISTORY OF PAROTID CANCER: ICD-10-CM

## 2023-06-05 DIAGNOSIS — E89.0 POSTSURGICAL HYPOTHYROIDISM: ICD-10-CM

## 2023-06-05 DIAGNOSIS — E89.0 POSTABLATIVE HYPOTHYROIDISM: Primary | ICD-10-CM

## 2023-06-05 DIAGNOSIS — R59.0 CERVICAL ADENOPATHY: ICD-10-CM

## 2023-06-05 NOTE — TELEPHONE ENCOUNTER
MERRY and sent MyC to schedule lymph node biopsy, FNA, and labs   Elisabet Webb on 6/5/2023 at 2:30 PM

## 2023-06-05 NOTE — TELEPHONE ENCOUNTER
Communication Summary: Spoke to patient with imaging scheduler and scheduled follow up appointment     Appointment type: US  Provider: N/A  Return date: 06/20/2023  Speciality phone number: [phone]  Additional appointment(s) needed: N/A  Additional notes: N/A    Faraz Guerrero on 6/5/2023 at 2:56 PM

## 2023-06-05 NOTE — TELEPHONE ENCOUNTER
Please schedule lymph node FNAB  in body radiology US at the [facility] (not [facility]) or any other [facility] US FNAB location .    Thanks  Maria Antonia Pacheco MD

## 2023-06-08 DIAGNOSIS — R76.8 ANTINUCLEAR FACTOR POSITIVE: Primary | ICD-10-CM

## 2023-06-13 ENCOUNTER — MYC MEDICAL ADVICE (OUTPATIENT)
Dept: INTERNAL MEDICINE | Facility: CLINIC | Age: 38
End: 2023-06-13
Payer: COMMERCIAL

## 2023-06-15 DIAGNOSIS — K21.00 GASTROESOPHAGEAL REFLUX DISEASE WITH ESOPHAGITIS, UNSPECIFIED WHETHER HEMORRHAGE: Primary | ICD-10-CM

## 2023-06-15 DIAGNOSIS — K62.5 BRBPR (BRIGHT RED BLOOD PER RECTUM): ICD-10-CM

## 2023-06-16 DIAGNOSIS — K92.1 HEMATOCHEZIA: Primary | ICD-10-CM

## 2023-06-27 ENCOUNTER — ANCILLARY PROCEDURE (OUTPATIENT)
Dept: ULTRASOUND IMAGING | Facility: CLINIC | Age: 38
End: 2023-06-27
Payer: COMMERCIAL

## 2023-06-27 DIAGNOSIS — R59.0 CERVICAL ADENOPATHY: ICD-10-CM

## 2023-06-27 DIAGNOSIS — E89.0 POSTSURGICAL HYPOTHYROIDISM: ICD-10-CM

## 2023-06-27 DIAGNOSIS — Z85.818 HISTORY OF PAROTID CANCER: ICD-10-CM

## 2023-06-27 DIAGNOSIS — E89.0 POSTABLATIVE HYPOTHYROIDISM: ICD-10-CM

## 2023-06-27 PROCEDURE — 88172 CYTP DX EVAL FNA 1ST EA SITE: CPT | Mod: 26 | Performed by: PATHOLOGY

## 2023-06-27 PROCEDURE — 88173 CYTOPATH EVAL FNA REPORT: CPT | Mod: 26 | Performed by: PATHOLOGY

## 2023-06-27 PROCEDURE — 86800 THYROGLOBULIN ANTIBODY: CPT

## 2023-06-27 PROCEDURE — 10005 FNA BX W/US GDN 1ST LES: CPT | Performed by: RADIOLOGY

## 2023-06-27 PROCEDURE — 88173 CYTOPATH EVAL FNA REPORT: CPT | Mod: TC

## 2023-06-27 RX ORDER — LIDOCAINE HYDROCHLORIDE 10 MG/ML
5 INJECTION, SOLUTION INFILTRATION; PERINEURAL ONCE
Status: COMPLETED | OUTPATIENT
Start: 2023-06-27 | End: 2023-06-27

## 2023-06-27 RX ADMIN — LIDOCAINE HYDROCHLORIDE 3 ML: 10 INJECTION, SOLUTION INFILTRATION; PERINEURAL at 10:20

## 2023-06-27 NOTE — DISCHARGE INSTRUCTIONS
Directions for after your Fine Needle Aspiration:    You can remove your bandage within a few hours.  The site of the biopsy may be sore for a day or two after the procedure. You can take over-the-counter pain medicine if needed.  Notify your doctor if you have any of the following:  Fever above 101 degrees  Swelling in the area of the biopsy  Redness or leaking from the biopsy site  Your doctor will notify you of the results in 2-3 days. Directions for after your Fine Needle Aspiration:    You can remove your bandage within a few hours.  The site of the biopsy may be sore for a day or two after the procedure. You can take over-the-counter pain medicine if needed.  Notify your doctor if you have any of the following:  Fever above 101 degrees  Swelling in the area of the biopsy  Redness or leaking from the biopsy site  Your doctor will notify you of the results in 2-3 days.

## 2023-06-28 LAB
PATH REPORT.COMMENTS IMP SPEC: ABNORMAL
PATH REPORT.COMMENTS IMP SPEC: YES
PATH REPORT.FINAL DX SPEC: ABNORMAL
PATH REPORT.GROSS SPEC: ABNORMAL
PATH REPORT.MICROSCOPIC SPEC OTHER STN: ABNORMAL
PATH REPORT.RELEVANT HX SPEC: ABNORMAL

## 2023-06-30 DIAGNOSIS — Z85.818 HISTORY OF PAROTID CANCER: ICD-10-CM

## 2023-06-30 DIAGNOSIS — C73 PAPILLARY THYROID CARCINOMA (H): ICD-10-CM

## 2023-06-30 DIAGNOSIS — R89.6 ABNORMAL CYTOLOGY: Primary | ICD-10-CM

## 2023-06-30 DIAGNOSIS — R59.0 CERVICAL ADENOPATHY: ICD-10-CM

## 2023-06-30 LAB — SCANNED LAB RESULT: NORMAL

## 2024-03-10 ENCOUNTER — HEALTH MAINTENANCE LETTER (OUTPATIENT)
Age: 39
End: 2024-03-10

## 2025-03-16 ENCOUNTER — HEALTH MAINTENANCE LETTER (OUTPATIENT)
Age: 40
End: 2025-03-16

## (undated) RX ORDER — LIDOCAINE HYDROCHLORIDE 10 MG/ML
INJECTION, SOLUTION EPIDURAL; INFILTRATION; INTRACAUDAL; PERINEURAL
Status: DISPENSED
Start: 2023-06-27